# Patient Record
Sex: FEMALE | Race: WHITE | NOT HISPANIC OR LATINO | Employment: UNEMPLOYED | URBAN - NONMETROPOLITAN AREA
[De-identification: names, ages, dates, MRNs, and addresses within clinical notes are randomized per-mention and may not be internally consistent; named-entity substitution may affect disease eponyms.]

---

## 2019-07-23 ENCOUNTER — OUTSIDE FACILITY SERVICE (OUTPATIENT)
Dept: CARDIOLOGY | Facility: CLINIC | Age: 49
End: 2019-07-23

## 2019-07-23 PROCEDURE — 93321 DOPPLER ECHO F-UP/LMTD STD: CPT | Performed by: INTERNAL MEDICINE

## 2019-07-23 PROCEDURE — 93325 DOPPLER ECHO COLOR FLOW MAPG: CPT | Performed by: INTERNAL MEDICINE

## 2019-07-23 PROCEDURE — 99223 1ST HOSP IP/OBS HIGH 75: CPT | Performed by: INTERNAL MEDICINE

## 2019-07-23 PROCEDURE — 93308 TTE F-UP OR LMTD: CPT | Performed by: INTERNAL MEDICINE

## 2019-07-24 ENCOUNTER — OUTSIDE FACILITY SERVICE (OUTPATIENT)
Dept: CARDIOLOGY | Facility: CLINIC | Age: 49
End: 2019-07-24

## 2019-07-24 PROCEDURE — 93018 CV STRESS TEST I&R ONLY: CPT | Performed by: INTERNAL MEDICINE

## 2019-07-24 PROCEDURE — 99232 SBSQ HOSP IP/OBS MODERATE 35: CPT | Performed by: INTERNAL MEDICINE

## 2019-07-24 PROCEDURE — 78452 HT MUSCLE IMAGE SPECT MULT: CPT | Performed by: INTERNAL MEDICINE

## 2019-07-25 DIAGNOSIS — R00.1 BRADYCARDIA: Primary | ICD-10-CM

## 2019-08-15 ENCOUNTER — OUTSIDE FACILITY SERVICE (OUTPATIENT)
Dept: CARDIOLOGY | Facility: CLINIC | Age: 49
End: 2019-08-15

## 2019-08-15 PROCEDURE — 0298T PR EXT ECG > 48HR TO 21 DAY REVIEW AND INTERPRETATN: CPT | Performed by: INTERNAL MEDICINE

## 2019-08-19 ENCOUNTER — TELEPHONE (OUTPATIENT)
Dept: CARDIOLOGY | Facility: CLINIC | Age: 49
End: 2019-08-19

## 2019-08-19 NOTE — TELEPHONE ENCOUNTER
Pt LM asking for refills on BP medication and statin. According to discharge summary it looks like Lisinopril 10 mg daily, Crestor 20 mg daily.  1)  Is it ok to refill?     2) Pt does not have a follow up scheduled. When do we need to see her?     Records in chart if you need to review.

## 2019-08-20 RX ORDER — ROSUVASTATIN CALCIUM 20 MG/1
20 TABLET, COATED ORAL DAILY
Qty: 90 TABLET | Refills: 3 | Status: SHIPPED | OUTPATIENT
Start: 2019-08-20 | End: 2021-01-19 | Stop reason: SDUPTHER

## 2019-08-20 RX ORDER — LISINOPRIL 10 MG/1
10 TABLET ORAL DAILY
Qty: 90 TABLET | Refills: 3 | Status: SHIPPED | OUTPATIENT
Start: 2019-08-20 | End: 2021-01-19 | Stop reason: SDUPTHER

## 2019-08-20 NOTE — TELEPHONE ENCOUNTER
Pt aware of appointment scheduled for 10/1/19, that refills have been sent in. States she is doing well, that her palpitations are much better than before. Asking for monitor results. Went over results and advised to call if she has any problems.

## 2020-06-16 ENCOUNTER — TELEPHONE (OUTPATIENT)
Dept: CARDIOLOGY | Facility: CLINIC | Age: 50
End: 2020-06-16

## 2020-06-16 NOTE — TELEPHONE ENCOUNTER
Received fax from Dr. Streeter for cardiac clearance for patient to have a colonoscopy. According to our records, I do not see where we have seen patient in our office. It looks like she was a consult at [facility] and had a stress and echo on 07/24/19.      Fax [phone]

## 2020-06-16 NOTE — TELEPHONE ENCOUNTER
Letter sent stating that clearance needed to come from PCP due to patient not being seen for almost a year.

## 2020-07-29 ENCOUNTER — OFFICE VISIT (OUTPATIENT)
Dept: CARDIOLOGY | Facility: CLINIC | Age: 50
End: 2020-07-29

## 2020-07-29 VITALS
DIASTOLIC BLOOD PRESSURE: 72 MMHG | TEMPERATURE: 97.8 F | HEART RATE: 64 BPM | BODY MASS INDEX: 32.43 KG/M2 | SYSTOLIC BLOOD PRESSURE: 120 MMHG | WEIGHT: 201.8 LBS | HEIGHT: 66 IN

## 2020-07-29 DIAGNOSIS — E88.81 METABOLIC SYNDROME: ICD-10-CM

## 2020-07-29 DIAGNOSIS — F41.0 PANIC ATTACK: ICD-10-CM

## 2020-07-29 DIAGNOSIS — R06.02 SHORTNESS OF BREATH: ICD-10-CM

## 2020-07-29 DIAGNOSIS — E78.00 HYPERCHOLESTEREMIA: ICD-10-CM

## 2020-07-29 DIAGNOSIS — F17.200 SMOKING: ICD-10-CM

## 2020-07-29 DIAGNOSIS — I10 ESSENTIAL HYPERTENSION: ICD-10-CM

## 2020-07-29 DIAGNOSIS — R00.2 PALPITATIONS: Primary | ICD-10-CM

## 2020-07-29 PROCEDURE — 99406 BEHAV CHNG SMOKING 3-10 MIN: CPT | Performed by: INTERNAL MEDICINE

## 2020-07-29 PROCEDURE — 99213 OFFICE O/P EST LOW 20 MIN: CPT | Performed by: INTERNAL MEDICINE

## 2020-07-29 RX ORDER — LAMOTRIGINE 100 MG/1
100 TABLET ORAL DAILY
COMMUNITY

## 2020-07-29 RX ORDER — IBUPROFEN 200 MG
200 TABLET ORAL EVERY 6 HOURS PRN
COMMUNITY

## 2020-07-29 RX ORDER — CITALOPRAM 40 MG/1
40 TABLET ORAL DAILY
COMMUNITY

## 2020-07-29 RX ORDER — CLONAZEPAM 1 MG/1
1 TABLET ORAL 2 TIMES DAILY PRN
COMMUNITY

## 2020-07-29 NOTE — PROGRESS NOTES
Chief Complaint   Patient presents with   • Hospital follow up     Patient was seen in hospital for symptomatic bradycardia and shortness of breath, had stress and echo 7/24/19. Patient requesting cardiac clearance for colonoscopy.    • Aspirin     Patient does not take.    • Shortness of Breath     Only with panic attacks.   • Rapid Heart Rate     Only with panic attacks.   • Labs     Last labs June. Is seeing Dr Salvador for elevated AST and ALT.       CARDIAC COMPLAINTS  dyspnea and palpitations        Annemarie Hampton is a 50 y.o. female came in today for her hospital follow-up visit.  I saw her almost a year ago when she got admitted to the hospital with chest pain, shortness of breath, dizziness and found to have a elevated blood pressure.  She was given some medication in the ER after which her blood pressure got better but she became bradycardic.  She also noted to have abnormal liver function test which apparently is chronic for her.  She underwent an echocardiogram and a stress test which shows normal LV function, no obvious ischemia noted.  Her effort tolerance was limited.  She was sent home and then had a Holter monitor placed which showed an average heart rate about 68 bpm and only 1 4 beat run of SVT noted she also had carotid ultrasound which did not show any significant changes.  She unfortunately was smoking at that time and she continues to smoke.  I have not seen her for almost a year now.  Since that admission she has been to the emergency room about twice with similar symptoms but no bradycardia at that time.  She was diagnosed with panic attack and she has been treated with medications.  She does have a family history of colon cancer and is now scheduled to undergo colonoscopy and is needed cardiac clearance.  She denies having any chest pain but she does have some shortness of breath and palpitation which occurs mostly when she gets the panic attacks.  She did bring copy of her  labs which was done recently. Her hemoglobin is 15.8 with a crit of 48.3.  Her cholesterol was 149 with the LDL of 81.  Her liver function is chronically elevated with the SGOT and SGPT of 70 and 81 and alkaline phosphatase of 217.                                                                                                                                                                                                                                     C            Cardiac History  Past Surgical History:   Procedure Laterality Date   • CARDIOVASCULAR STRESS TEST  07/24/2019    @ [facility]. 6 Min.24 Secs.8.20 METS. 62% THR. BP- 190/100. EF > 70%. Negative.   • CONVERTED (HISTORICAL) HOLTER  08/15/2019    < 14 Days. AVG 68. . One 4 beats run of SVT   • ECHO - CONVERTED  07/23/2019    @ [facility]. TLS. [place]. EF 60%. Trace-Mild MR   • US CAROTID UNILATERAL  07/23/2019    @ [facility]. No sig lesions.       Current Outpatient Medications   Medication Sig Dispense Refill   • citalopram (CeleXA) 40 MG tablet Take 40 mg by mouth Daily.     • clonazePAM (KlonoPIN) 1 MG tablet Take 1 mg by mouth 2 (Two) Times a Day As Needed for Seizures.     • ibuprofen (ADVIL,MOTRIN) 200 MG tablet Take 200 mg by mouth Every 6 (Six) Hours As Needed for Mild Pain .     • lamoTRIgine (LaMICtal) 100 MG tablet Take 100 mg by mouth Daily.     • lisinopril (PRINIVIL,ZESTRIL) 10 MG tablet Take 1 tablet by mouth Daily. 90 tablet 3   • rosuvastatin (CRESTOR) 20 MG tablet Take 1 tablet by mouth Daily. 90 tablet 3   • nicotine (NICOTROL) 10 MG inhaler Inhale 1 puff As Needed for Smoking Cessation. 168 inhaler 4     No current facility-administered medications for this visit.        Allergies  :  Patient has no known allergies.       Past Medical History:   Diagnosis Date   • History of benign breast tumor    • Hx of laparoscopic gastric banding    • Hx of tubal ligation    • Hyperlipidemia    • Hypertension    • Mitral valve prolapse        Social History     "    Socioeconomic History   • Marital status:      Spouse name: Not on file   • Number of children: Not on file   • Years of education: Not on file   • Highest education level: Not on file   Tobacco Use   • Smoking status: Current Every Day Smoker     Packs/day: 1.00     Years: 30.00     Pack years: 30.00   • Smokeless tobacco: Never Used   Substance and Sexual Activity   • Alcohol use: Never     Frequency: Never   • Drug use: Never       Family History   Problem Relation Age of Onset   • Heart disease Mother    • Hypertension Mother    • Heart attack Mother         CABG x5   • Hypertension Father    • Cervical cancer Sister    • No Known Problems Daughter    • Hypertension Brother    • Hyperlipidemia Brother    • Crohn's disease Daughter        Review of Systems   Constitution: Positive for malaise/fatigue and weight loss. Negative for decreased appetite.   HENT: Negative for congestion and sore throat.    Eyes: Negative for blurred vision.   Cardiovascular: Positive for dyspnea on exertion and palpitations. Negative for chest pain.   Respiratory: Negative for shortness of breath and snoring.    Endocrine: Negative for cold intolerance and heat intolerance.   Hematologic/Lymphatic: Negative for adenopathy. Does not bruise/bleed easily.   Skin: Negative for itching, nail changes and skin cancer.   Musculoskeletal: Negative for arthritis and myalgias.   Gastrointestinal: Negative for abdominal pain, dysphagia and heartburn.   Genitourinary: Negative for bladder incontinence and frequency.   Neurological: Negative for dizziness, light-headedness, seizures and vertigo.   Psychiatric/Behavioral: Negative for altered mental status. The patient is nervous/anxious.    Allergic/Immunologic: Negative for environmental allergies and hives.       Diabetes- No  Thyroid- normal    Objective     /72 (BP Location: Right arm)   Pulse 64   Temp 97.8 °F (36.6 °C)   Ht 167.6 cm (66\")   Wt 91.5 kg (201 lb 12.8 oz)   " BMI 32.57 kg/m²     Physical Exam   Constitutional: She is oriented to person, place, and time. She appears well-developed and well-nourished.   HENT:   Head: Normocephalic.   Nose: Nose normal.   Eyes: Pupils are equal, round, and reactive to light. EOM are normal.   Neck: Normal range of motion. Neck supple.   Cardiovascular: Normal rate, regular rhythm, S1 normal and S2 normal.   Murmur heard.  Pulmonary/Chest: Effort normal and breath sounds normal.   Abdominal: Soft. Bowel sounds are normal.   Musculoskeletal: Normal range of motion. She exhibits no edema.   Neurological: She is alert and oriented to person, place, and time.   Skin: Skin is warm and dry.   Psychiatric: She has a normal mood and affect.     Procedures            Assessment/Plan   Patient's Body mass index is 32.57 kg/m². BMI is above normal parameters. Recommendations include: educational material, exercise counseling and nutrition counseling.     Jennifer was seen today for hospital follow up, aspirin, shortness of breath, rapid heart rate and labs.    Diagnoses and all orders for this visit:    Palpitations    Shortness of breath    Essential hypertension    Hypercholesteremia    Metabolic syndrome    Smoking  -     nicotine (NICOTROL) 10 MG inhaler; Inhale 1 puff As Needed for Smoking Cessation.    Panic attack    At baseline her heart rate is stable.  Her blood pressure is normal.  Her BMI though it is elevated at 33.  She apparently has lost about 10 pounds since have seen her a year ago but she still mildly obese.  Her cardiovascular examination is unremarkable other than very short systolic murmur at the mitral area.  She does have a normal peripheral pulse and no pedal edema.    Her symptoms of palpitation appears to be more of anxiety related.  I am afraid to put her on any kind of beta-blockers given her history of bradycardia.  Since it occurs very rarely will continue to monitor    Shortness of breath most likely secondary to his  smoking but part of her obesity also needs to be considered.  I talked to her about diet exercise and weight reduction.  I gave her papers on Mediterranean diet    Regarding the smoking, I had a long talk with her about the smoking.  There is increased risk of developing malignancy, vascular problem, COPD.  She is willing to try to quit.  She is afraid to take Chantix.  I gave her prescription for Nicotrol inhalers.    Regarding the hypercholesterolemia, it seems to be very well controlled with Crestor.  Continue the same    Regarding the blood pressure, it is very well controlled with ACE inhibitor so we will continue the same    Overall her cardiac status appears stable.  Cardiac clearance will be given for the colonoscopy.    I will see her back in 6 months or sooner if needed.                    Electronically signed by Octavia Lopez MD July 29, 2020 15:57

## 2020-07-29 NOTE — PROGRESS NOTES
Jennifer Hampton  reports that she has been smoking. She has a 30.00 pack-year smoking history. She has never used smokeless tobacco.. I have educated her on the risk of diseases from using tobacco products such as cancer, COPD and heart diease.     I advised her to quit and she is willing to quit. We have discussed the following method/s for tobacco cessation:  Education Material Counseling Prescription Medicaiton.  Together we have set a quit date for 1 week from today.  She will follow up with me in 6 months or sooner to check on her progress.    I spent 3.5 minutes counseling the patient.

## 2020-07-29 NOTE — PATIENT INSTRUCTIONS
Mediterranean Diet  A Mediterranean diet refers to food and lifestyle choices that are based on the traditions of countries located on the [place]. This way of eating has been shown to help prevent certain conditions and improve outcomes for people who have chronic diseases, like kidney disease and heart disease.  What are tips for following this plan?  Lifestyle  · Cook and eat meals together with your family, when possible.  · Drink enough fluid to keep your urine clear or pale yellow.  · Be physically active every day. This includes:  ? Aerobic exercise like running or swimming.  ? Leisure activities like gardening, walking, or housework.  · Get 7-8 hours of sleep each night.  · If recommended by your health care provider, drink red wine in moderation. This means 1 glass a day for nonpregnant women and 2 glasses a day for men. A glass of wine equals 5 oz (150 mL).  Reading food labels    · Check the serving size of packaged foods. For foods such as rice and pasta, the serving size refers to the amount of cooked product, not dry.  · Check the total fat in packaged foods. Avoid foods that have saturated fat or trans fats.  · Check the ingredients list for added sugars, such as corn syrup.  Shopping  · At the grocery store, buy most of your food from the areas near the walls of the store. This includes:  ? Fresh fruits and vegetables (produce).  ? Grains, beans, nuts, and seeds. Some of these may be available in unpackaged forms or large amounts (in bulk).  ? Fresh seafood.  ? Poultry and eggs.  ? Low-fat dairy products.  · Buy whole ingredients instead of prepackaged foods.  · Buy fresh fruits and vegetables in-season from local farmers markets.  · Buy frozen fruits and vegetables in resealable bags.  · If you do not have access to quality fresh seafood, buy precooked frozen shrimp or canned fish, such as tuna, salmon, or sardines.  · Buy small amounts of raw or cooked vegetables, salads, or olives from  the deli or salad bar at your store.  · Stock your pantry so you always have certain foods on hand, such as olive oil, canned tuna, canned tomatoes, rice, pasta, and beans.  Cooking  · Cook foods with extra-virgin olive oil instead of using butter or other vegetable oils.  · Have meat as a side dish, and have vegetables or grains as your main dish. This means having meat in small portions or adding small amounts of meat to foods like pasta or stew.  · Use beans or vegetables instead of meat in common dishes like chili or lasagna.  · [place] with different cooking methods. Try roasting or broiling vegetables instead of steaming or sautéeing them.  · Add frozen vegetables to soups, stews, pasta, or rice.  · Add nuts or seeds for added healthy fat at each meal. You can add these to yogurt, salads, or vegetable dishes.  · Marinate fish or vegetables using olive oil, lemon juice, garlic, and fresh herbs.  Meal planning    · Plan to eat 1 vegetarian meal one day each week. Try to work up to 2 vegetarian meals, if possible.  · Eat seafood 2 or more times a week.  · Have healthy snacks readily available, such as:  ? Vegetable sticks with hummus.  ? Greek yogurt.  ? Fruit and nut trail mix.  · Eat balanced meals throughout the week. This includes:  ? Fruit: 2-3 servings a day  ? Vegetables: 4-5 servings a day  ? Low-fat dairy: 2 servings a day  ? Fish, poultry, or lean meat: 1 serving a day  ? Beans and legumes: 2 or more servings a week  ? Nuts and seeds: 1-2 servings a day  ? Whole grains: 6-8 servings a day  ? Extra-virgin olive oil: 3-4 servings a day  · Limit red meat and sweets to only a few servings a month  What are my food choices?  · Mediterranean diet  ? Recommended  § Grains: Whole-grain pasta. Brown rice. Bulgar wheat. Polenta. Couscous. Whole-wheat bread. Oatmeal. Quinoa.  § Vegetables: Artichokes. Beets. Broccoli. Cabbage. Carrots. Eggplant. Green beans. Chard. Kale. Spinach. Onions. Leeks. Peas. Squash.  Tomatoes. Peppers. Radishes.  § Fruits: Apples. Apricots. Avocado. Berries. Bananas. Cherries. Dates. Figs. Grapes. María Elena. Melon. Oranges. Peaches. Plums. Pomegranate.  § Meats and other protein foods: Beans. Almonds. Sunflower seeds. Pine nuts. Peanuts. Cod. [place]. Scallops. Shrimp. Tuna. Tilapia. Clams. Oysters. Eggs.  § Dairy: Low-fat milk. Cheese. Greek yogurt.  § Beverages: Water. Red wine. Herbal tea.  § Fats and oils: Extra virgin olive oil. Avocado oil. Grape seed oil.  § Sweets and desserts: Greek yogurt with honey. Baked apples. Poached pears. Trail mix.  § Seasoning and other foods: Basil. Cilantro. Coriander. Cumin. Mint. Parsley. [place]. Rosemary. Tarragon. Garlic. Oregano. Thyme. Pepper. Balsalmic vinegar. Tahini. Hummus. Tomato sauce. Olives. Mushrooms.  ? Limit these  § Grains: Prepackaged pasta or rice dishes. Prepackaged cereal with added sugar.  § Vegetables: Deep fried potatoes (french fries).  § Fruits: Fruit canned in syrup.  § Meats and other protein foods: Beef. Pork. Lamb. Poultry with skin. Hot dogs. [place].  § Dairy: Ice cream. Sour cream. Whole milk.  § Beverages: Juice. Sugar-sweetened soft drinks. Beer. Liquor and spirits.  § Fats and oils: Butter. Canola oil. Vegetable oil. Beef fat (tallow). Lard.  § Sweets and desserts: Cookies. Cakes. Pies. Candy.  § Seasoning and other foods: Mayonnaise. Premade sauces and marinades.  The items listed may not be a complete list. Talk with your dietitian about what dietary choices are right for you.  Summary  · The Mediterranean diet includes both food and lifestyle choices.  · Eat a variety of fresh fruits and vegetables, beans, nuts, seeds, and whole grains.  · Limit the amount of red meat and sweets that you eat.  · Talk with your health care provider about whether it is safe for you to drink red wine in moderation. This means 1 glass a day for nonpregnant women and 2 glasses a day for men. A glass of wine equals 5 oz (150 mL).  This information  is not intended to replace advice given to you by your health care provider. Make sure you discuss any questions you have with your health care provider.  Document Released: 08/10/2017 Document Revised: 08/17/2017 Document Reviewed: 08/10/2017  Elsevier Patient Education © 2020 [employer]  For more information:    Quit Now [place]  1-800-QUIT-NOW  https://[place].quitlogix.org/en-[place]/  Steps to Quit Smoking  Smoking tobacco can be harmful to your health and can affect almost every organ in your body. Smoking puts you, and those around you, at risk for developing many serious chronic diseases. Quitting smoking is difficult, but it is one of the best things that you can do for your health. It is never too late to quit.  What are the benefits of quitting smoking?  When you quit smoking, you lower your risk of developing serious diseases and conditions, such as:  · Lung cancer or lung disease, such as COPD.  · Heart disease.  · Stroke.  · Heart attack.  · Infertility.  · Osteoporosis and bone fractures.  Additionally, symptoms such as coughing, wheezing, and shortness of breath may get better when you quit. You may also find that you get sick less often because your body is stronger at fighting off colds and infections. If you are pregnant, quitting smoking can help to reduce your chances of having a baby of low birth weight.  How do I get ready to quit?  When you decide to quit smoking, create a plan to make sure that you are successful. Before you quit:  · Pick a date to quit. Set a date within the next two weeks to give you time to prepare.  · Write down the reasons why you are quitting. Keep this list in places where you will see it often, such as on your bathroom mirror or in your car or wallet.  · Identify the people, places, things, and activities that make you want to smoke (triggers) and avoid them. Make sure to take these actions:  ¨ Throw away all cigarettes at home, at work, and in your car.  ¨ Throw  away smoking accessories, such as ashtrays and lighters.  ¨ Clean your car and make sure to empty the ashtray.  ¨ Clean your home, including curtains and carpets.  · Tell your family, friends, and coworkers that you are quitting. Support from your loved ones can make quitting easier.  · Talk with your health care provider about your options for quitting smoking.  · Find out what treatment options are covered by your health insurance.  What strategies can I use to quit smoking?  Talk with your healthcare provider about different strategies to quit smoking. Some strategies include:  · Quitting smoking altogether instead of gradually lessening how much you smoke over a period of time. Research shows that quitting “cold turkey” is more successful than gradually quitting.  · Attending in-person counseling to help you build problem-solving skills. You are more likely to have success in quitting if you attend several counseling sessions. Even short sessions of 10 minutes can be effective.  · Finding resources and support systems that can help you to quit smoking and remain smoke-free after you quit. These resources are most helpful when you use them often. They can include:  ¨ Online chats with a counselor.  ¨ Telephone quitlines.  ¨ Printed self-help materials.  ¨ Support groups or group counseling.  ¨ Text messaging programs.  ¨ Mobile phone applications.  · Taking medicines to help you quit smoking. (If you are pregnant or breastfeeding, talk with your health care provider first.) Some medicines contain nicotine and some do not. Both types of medicines help with cravings, but the medicines that include nicotine help to relieve withdrawal symptoms. Your health care provider may recommend:  ¨ Nicotine patches, gum, or lozenges.  ¨ Nicotine inhalers or sprays.  ¨ Non-nicotine medicine that is taken by mouth.  Talk with your health care provider about combining strategies, such as taking medicines while you are also  receiving in-person counseling. Using these two strategies together makes you more likely to succeed in quitting than if you used either strategy on its own.  If you are pregnant or breastfeeding, talk with your health care provider about finding counseling or other support strategies to quit smoking. Do not take medicine to help you quit smoking unless told to do so by your health care provider.  What things can I do to make it easier to quit?  Quitting smoking might feel overwhelming at first, but there is a lot that you can do to make it easier. Take these important actions:  · Reach out to your family and friends and ask that they support and encourage you during this time. Call telephone quitlines, reach out to support groups, or work with a counselor for support.  · Ask people who smoke to avoid smoking around you.  · Avoid places that trigger you to smoke, such as bars, parties, or smoke-break areas at work.  · Spend time around people who do not smoke.  · Lessen stress in your life, because stress can be a smoking trigger for some people. To lessen stress, try:  ¨ Exercising regularly.  ¨ Deep-breathing exercises.  ¨ Yoga.  ¨ Meditating.  ¨ Performing a body scan. This involves closing your eyes, scanning your body from head to toe, and noticing which parts of your body are particularly tense. Purposefully relax the muscles in those areas.  · Download or purchase mobile phone or tablet apps (applications) that can help you stick to your quit plan by providing reminders, tips, and encouragement. There are many free apps, such as QuitGuide from the CDC (Centers for Disease Control and Prevention). You can find other support for quitting smoking (smoking cessation) through smokefree.gov and other websites.  How will I feel when I quit smoking?  Within the first 24 hours of quitting smoking, you may start to feel some withdrawal symptoms. These symptoms are usually most noticeable 2-3 days after quitting, but  they usually do not last beyond 2-3 weeks. Changes or symptoms that you might experience include:  · Mood swings.  · Restlessness, anxiety, or irritation.  · Difficulty concentrating.  · Dizziness.  · Strong cravings for sugary foods in addition to nicotine.  · Mild weight gain.  · Constipation.  · Nausea.  · Coughing or a sore throat.  · Changes in how your medicines work in your body.  · A depressed mood.  · Difficulty sleeping (insomnia).  After the first 2-3 weeks of quitting, you may start to notice more positive results, such as:  · Improved sense of smell and taste.  · Decreased coughing and sore throat.  · Slower heart rate.  · Lower blood pressure.  · Clearer skin.  · The ability to breathe more easily.  · Fewer sick days.  Quitting smoking is very challenging for most people. Do not get discouraged if you are not successful the first time. Some people need to make many attempts to quit before they achieve long-term success. Do your best to stick to your quit plan, and talk with your health care provider if you have any questions or concerns.  This information is not intended to replace advice given to you by your health care provider. Make sure you discuss any questions you have with your health care provider.  Document Released: 12/12/2002 Document Revised: 08/15/2017 Document Reviewed: 05/03/2016  Elsevier Interactive Patient Education © 2017 [employer] Inc.

## 2020-07-31 ENCOUNTER — TELEPHONE (OUTPATIENT)
Dept: CARDIOLOGY | Facility: CLINIC | Age: 50
End: 2020-07-31

## 2020-07-31 DIAGNOSIS — F17.200 SMOKING: ICD-10-CM

## 2020-07-31 NOTE — TELEPHONE ENCOUNTER
Ave from pharmacy asking for clarification on Nicotine inhaler, clarification script sent to pharmacy per Dr Lopez Nicotine 10mg inhaler 1 puff as needed up to 16 cartridges daily.

## 2021-01-19 ENCOUNTER — OFFICE VISIT (OUTPATIENT)
Dept: CARDIOLOGY | Facility: CLINIC | Age: 51
End: 2021-01-19

## 2021-01-19 VITALS
TEMPERATURE: 98.6 F | WEIGHT: 186.8 LBS | HEIGHT: 66 IN | BODY MASS INDEX: 30.02 KG/M2 | DIASTOLIC BLOOD PRESSURE: 72 MMHG | HEART RATE: 70 BPM | SYSTOLIC BLOOD PRESSURE: 120 MMHG

## 2021-01-19 DIAGNOSIS — E78.00 HYPERCHOLESTEREMIA: ICD-10-CM

## 2021-01-19 DIAGNOSIS — R06.02 SHORTNESS OF BREATH: ICD-10-CM

## 2021-01-19 DIAGNOSIS — I10 ESSENTIAL HYPERTENSION: Primary | ICD-10-CM

## 2021-01-19 DIAGNOSIS — F17.200 SMOKING: ICD-10-CM

## 2021-01-19 DIAGNOSIS — E88.81 METABOLIC SYNDROME: ICD-10-CM

## 2021-01-19 PROCEDURE — 99213 OFFICE O/P EST LOW 20 MIN: CPT | Performed by: NURSE PRACTITIONER

## 2021-01-19 RX ORDER — LISINOPRIL 10 MG/1
10 TABLET ORAL DAILY
Qty: 90 TABLET | Refills: 3 | Status: SHIPPED | OUTPATIENT
Start: 2021-01-19

## 2021-01-19 RX ORDER — ROSUVASTATIN CALCIUM 20 MG/1
20 TABLET, COATED ORAL DAILY
Qty: 90 TABLET | Refills: 3 | Status: SHIPPED | OUTPATIENT
Start: 2021-01-19

## 2021-01-19 NOTE — PROGRESS NOTES
Chief Complaint   Patient presents with   • Follow-up     Cardiac managment. Has no cardiac complaints today. Has recently had Liposuction, tummy tuck and breast reduction.. Is recovering well .   • Labs     Labs from July 2020   • Med Refill     Needs refills on Crestor and Lisinopril 90 day supply to  [facility] pharmacy       Subjective       Jennifer Hampton is a 50 y.o. female with history of chest pain, shortness breath, dizziness.  Labs showed abnormal liver function test, chronic condition.  Echo and stress showed normal LV function no obvious ischemia.  Cardiac monitor showed average heart rate 68 bpm and only 1 4 beat run of SVT.  Carotid ultrasound was negative.  She is a smoker.  Later she was diagnosed with panic attack and treated with medication.    Today she comes the office for follow-up visit.  From a cardiac standpoint she is without complaints or concerns voiced at this time.  A couple of months ago she did undergo surgical procedure having liposuction, tummy tuck, breast reduction.  She denies any cardiac complications and is recovering well.    Cardiac History:    Past Surgical History:   Procedure Laterality Date   • CARDIOVASCULAR STRESS TEST  07/24/2019    @ [facility]. 6 Min.24 Secs.8.20 METS. 62% THR. BP- 190/100. EF > 70%. Negative.   • CONVERTED (HISTORICAL) HOLTER  08/15/2019    < 14 Days. AVG 68. . One 4 beats run of SVT   • ECHO - CONVERTED  07/23/2019    @ [facility]. TLS. Mark [facility]. EF 60%. Trace-Mild MR   • US CAROTID UNILATERAL  07/23/2019    @ [facility]. No sig lesions.       Current Outpatient Medications   Medication Sig Dispense Refill   • citalopram (CeleXA) 40 MG tablet Take 40 mg by mouth Daily.     • clonazePAM (KlonoPIN) 1 MG tablet Take 1 mg by mouth 2 (Two) Times a Day As Needed for Seizures.     • ibuprofen (ADVIL,MOTRIN) 200 MG tablet Take 200 mg by mouth Every 6 (Six) Hours As Needed for Mild Pain .     • lamoTRIgine (LaMICtal) 100 MG tablet Take 100 mg by mouth Daily.     •  lisinopril (PRINIVIL,ZESTRIL) 10 MG tablet Take 1 tablet by mouth Daily. 90 tablet 3   • nicotine (NICOTROL) 10 MG inhaler One puff as needed up to 16 cartridges daily 168 inhaler 4   • rosuvastatin (CRESTOR) 20 MG tablet Take 1 tablet by mouth Daily. 90 tablet 3     No current facility-administered medications for this visit.        Patient has no known allergies.    Past Medical History:   Diagnosis Date   • History of benign breast tumor    • Hx of laparoscopic gastric banding    • Hx of tubal ligation    • Hyperlipidemia    • Hypertension    • Mitral valve prolapse        Social History     Socioeconomic History   • Marital status:      Spouse name: Not on file   • Number of children: Not on file   • Years of education: Not on file   • Highest education level: Not on file   Tobacco Use   • Smoking status: Current Every Day Smoker     Packs/day: 1.00     Years: 30.00     Pack years: 30.00   • Smokeless tobacco: Never Used   Substance and Sexual Activity   • Alcohol use: Never     Frequency: Never   • Drug use: Never       Family History   Problem Relation Age of Onset   • Heart disease Mother    • Hypertension Mother    • Heart attack Mother         CABG x5   • Hypertension Father    • Cervical cancer Sister    • No Known Problems Daughter    • Hypertension Brother    • Hyperlipidemia Brother    • Crohn's disease Daughter        Review of Systems   Constitution: Negative for decreased appetite, diaphoresis and malaise/fatigue.   HENT: Negative for nosebleeds.    Eyes: Negative for blurred vision.   Cardiovascular: Negative for chest pain, claudication, cyanosis, dyspnea on exertion, irregular heartbeat, leg swelling, near-syncope, orthopnea, palpitations, paroxysmal nocturnal dyspnea and syncope.   Respiratory: Negative for shortness of breath.    Endocrine: Negative for cold intolerance and heat intolerance.   Hematologic/Lymphatic: Does not bruise/bleed easily.   Skin: Negative for rash.    "  Musculoskeletal: Negative for myalgias.   Gastrointestinal: Negative for heartburn, melena and nausea.   Genitourinary: Negative for dysuria and hematuria.   Neurological: Negative for dizziness and light-headedness.   Psychiatric/Behavioral: The patient does not have insomnia and is not nervous/anxious.         BP Readings from Last 5 Encounters:   01/19/21 120/72   07/29/20 120/72       Wt Readings from Last 5 Encounters:   01/19/21 84.7 kg (186 lb 12.8 oz)   07/29/20 91.5 kg (201 lb 12.8 oz)       Objective     /72 (BP Location: Left arm)   Pulse 70   Temp 98.6 °F (37 °C)   Ht 167.6 cm (66\")   Wt 84.7 kg (186 lb 12.8 oz)   BMI 30.15 kg/m²     Vitals signs and nursing note reviewed.   Eyes:      Pupils: Pupils are equal, round, and reactive to light.   HENT:      Head: Normocephalic.   Neck:      Musculoskeletal: Normal range of motion.   Pulmonary:      Breath sounds: Normal breath sounds.   Cardiovascular:      Normal rate. Regular rhythm.   Edema:     Peripheral edema absent.   Abdominal:      General: Bowel sounds are normal.      Palpations: Abdomen is soft.   Musculoskeletal: Normal range of motion.   Skin:     General: Skin is warm.   Neurological:      Mental Status: Alert and oriented to person, place, and time.          Procedures : none today          Assessment/Plan   Diagnoses and all orders for this visit:    1. Essential hypertension (Primary)    2. Hypercholesteremia    3. Metabolic syndrome    4. Shortness of breath    5. Smoking    Other orders  -     lisinopril (PRINIVIL,ZESTRIL) 10 MG tablet; Take 1 tablet by mouth Daily.  Dispense: 90 tablet; Refill: 3  -     rosuvastatin (CRESTOR) 20 MG tablet; Take 1 tablet by mouth Daily.  Dispense: 90 tablet; Refill: 3      Hypertension-blood pressure well controlled.  Continue same dose lisinopril and refills faxed to her pharmacy.    Hypercholesterolemia-On Crestor without side effects noted.  Refills faxed to her pharmacy.  She will follow " with you for lab orders/management. Please forward a copy of next lab results. LFT monitored and managed by gastroenterologist.     Metabolic syndrome-Patient's Body mass index is 30.15 kg/m². BMI is above normal parameters. Recommendations include: nutrition counseling.  Her weight is down since last office visit.  I encouraged her on Mediterranean type diet.  Goal BMI around 25 encouraged.    Shortness of breat- not a new or worsening symptom.      Jennifer Hampton  reports that she has been smoking. She has a 30.00 pack-year smoking history. She has never used smokeless tobacco.. I have educated her on the risk of diseases from using tobacco products such as cancer, COPD and heart disease.     Patient appears stable from a cardiac standpoint.  We will see her on an annual basis.  Please call sooner for cardiac concerns.         Electronically signed by SHASHI Witt,  January 20, 2021 12:32 EST